# Patient Record
Sex: MALE | Race: ASIAN | NOT HISPANIC OR LATINO | Employment: FULL TIME | URBAN - METROPOLITAN AREA
[De-identification: names, ages, dates, MRNs, and addresses within clinical notes are randomized per-mention and may not be internally consistent; named-entity substitution may affect disease eponyms.]

---

## 2023-06-06 ENCOUNTER — OFFICE VISIT (OUTPATIENT)
Dept: PRIMARY CARE | Facility: CLINIC | Age: 33
End: 2023-06-06
Payer: COMMERCIAL

## 2023-06-06 VITALS
DIASTOLIC BLOOD PRESSURE: 72 MMHG | HEIGHT: 67 IN | RESPIRATION RATE: 18 BRPM | OXYGEN SATURATION: 99 % | WEIGHT: 214 LBS | BODY MASS INDEX: 33.59 KG/M2 | TEMPERATURE: 98 F | HEART RATE: 73 BPM | SYSTOLIC BLOOD PRESSURE: 118 MMHG

## 2023-06-06 DIAGNOSIS — M10.9 GOUT OF MULTIPLE SITES, UNSPECIFIED CAUSE, UNSPECIFIED CHRONICITY: Primary | ICD-10-CM

## 2023-06-06 PROCEDURE — 99214 OFFICE O/P EST MOD 30 MIN: CPT | Performed by: FAMILY MEDICINE

## 2023-06-06 PROCEDURE — 1036F TOBACCO NON-USER: CPT | Performed by: FAMILY MEDICINE

## 2023-06-06 RX ORDER — ALLOPURINOL 100 MG/1
100 TABLET ORAL DAILY
Qty: 90 TABLET | Refills: 1 | Status: SHIPPED | OUTPATIENT
Start: 2023-06-06 | End: 2023-07-21 | Stop reason: SDUPTHER

## 2023-06-06 RX ORDER — COLCHICINE 0.6 MG/1
TABLET ORAL
COMMUNITY

## 2023-06-06 NOTE — PROGRESS NOTES
"Subjective   Patient ID: John Barker is a 32 y.o. male who presents for Gout (Pt would like to discuss tx for Gout, was on Colchicine but has ran out).    HPI   Reports that he gets about one attack of gout a month.  Previously was just in his right 2nd finger but most recently had significant pain in the right first toe and did not realize it was gout initially.  Colchicine helped sometime but getting attacks frequently.    Review of Systems  Negative unless noted in HPI    Objective   /72 (BP Location: Left arm, Patient Position: Sitting)   Pulse 73   Temp 36.7 °C (98 °F) (Temporal)   Resp 18   Ht 1.702 m (5' 7.01\")   Wt 97.1 kg (214 lb)   SpO2 99%   BMI 33.51 kg/m²     Physical Exam  Pulmonary:      Effort: Pulmonary effort is normal.   Musculoskeletal:      Comments: Right foot, 1st toe at MTP joint there is some mild residual erythema present   Neurological:      Mental Status: He is alert.         Assessment/Plan   Problem List Items Addressed This Visit          Other    Gout of multiple sites - Primary     Records reviewed including past uric acid level of 8.4  Due to frequency of exacerbations and now presence of several joints we will go ahead and start on prophylaxis  Allopurinol 100mg initially, did discuss that likely higher dose would be needed to achieve target uric acid level  Discussed diet and how it pertains to exacerbations, recommendations to avoid foods high in purines discussed         Relevant Medications    allopurinol (Zyloprim) 100 mg tablet          "

## 2023-06-06 NOTE — ASSESSMENT & PLAN NOTE
Records reviewed including past uric acid level of 8.4  Due to frequency of exacerbations and now presence of several joints we will go ahead and start on prophylaxis  Allopurinol 100mg initially, did discuss that likely higher dose would be needed to achieve target uric acid level  Discussed diet and how it pertains to exacerbations, recommendations to avoid foods high in purines discussed

## 2023-07-21 DIAGNOSIS — M10.9 GOUT OF MULTIPLE SITES, UNSPECIFIED CAUSE, UNSPECIFIED CHRONICITY: ICD-10-CM

## 2023-07-21 RX ORDER — ALLOPURINOL 300 MG/1
300 TABLET ORAL DAILY
Qty: 90 TABLET | Refills: 1 | Status: SHIPPED | OUTPATIENT
Start: 2023-07-21 | End: 2024-02-05

## 2023-09-09 DIAGNOSIS — L30.9 ECZEMA, UNSPECIFIED TYPE: Primary | ICD-10-CM

## 2023-09-11 RX ORDER — TRIAMCINOLONE ACETONIDE 1 MG/G
CREAM TOPICAL 2 TIMES DAILY
Qty: 30 G | Refills: 1 | Status: SHIPPED | OUTPATIENT
Start: 2023-09-11 | End: 2024-01-26

## 2023-10-05 ENCOUNTER — OFFICE VISIT (OUTPATIENT)
Dept: PRIMARY CARE | Facility: CLINIC | Age: 33
End: 2023-10-05
Payer: COMMERCIAL

## 2023-10-05 VITALS
BODY MASS INDEX: 34.31 KG/M2 | HEIGHT: 67 IN | SYSTOLIC BLOOD PRESSURE: 112 MMHG | TEMPERATURE: 97.5 F | HEART RATE: 84 BPM | OXYGEN SATURATION: 98 % | WEIGHT: 218.6 LBS | DIASTOLIC BLOOD PRESSURE: 74 MMHG

## 2023-10-05 DIAGNOSIS — Z00.00 HEALTH MAINTENANCE EXAMINATION: Primary | ICD-10-CM

## 2023-10-05 DIAGNOSIS — M1A.09X0 CHRONIC GOUT OF MULTIPLE SITES, UNSPECIFIED CAUSE: ICD-10-CM

## 2023-10-05 DIAGNOSIS — Z23 NEED FOR INFLUENZA VACCINATION: ICD-10-CM

## 2023-10-05 LAB
ALBUMIN SERPL BCP-MCNC: 4.9 G/DL (ref 3.4–5)
ALP SERPL-CCNC: 66 U/L (ref 33–120)
ALT SERPL W P-5'-P-CCNC: 113 U/L (ref 10–52)
ANION GAP SERPL CALC-SCNC: 14 MMOL/L (ref 10–20)
AST SERPL W P-5'-P-CCNC: 39 U/L (ref 9–39)
BILIRUB SERPL-MCNC: 0.5 MG/DL (ref 0–1.2)
BUN SERPL-MCNC: 13 MG/DL (ref 6–23)
CALCIUM SERPL-MCNC: 10.2 MG/DL (ref 8.6–10.6)
CHLORIDE SERPL-SCNC: 104 MMOL/L (ref 98–107)
CHOLEST SERPL-MCNC: 260 MG/DL (ref 0–199)
CHOLESTEROL/HDL RATIO: 6.6
CO2 SERPL-SCNC: 28 MMOL/L (ref 21–32)
CREAT SERPL-MCNC: 0.89 MG/DL (ref 0.5–1.3)
ERYTHROCYTE [DISTWIDTH] IN BLOOD BY AUTOMATED COUNT: 14.7 % (ref 11.5–14.5)
GFR SERPL CREATININE-BSD FRML MDRD: >90 ML/MIN/1.73M*2
GLUCOSE SERPL-MCNC: 94 MG/DL (ref 74–99)
HCT VFR BLD AUTO: 49.4 % (ref 41–52)
HDLC SERPL-MCNC: 39.2 MG/DL
HGB BLD-MCNC: 15.4 G/DL (ref 13.5–17.5)
LDLC SERPL CALC-MCNC: 169 MG/DL (ref 130–180)
MCH RBC QN AUTO: 26.9 PG (ref 26–34)
MCHC RBC AUTO-ENTMCNC: 31.2 G/DL (ref 32–36)
MCV RBC AUTO: 86 FL (ref 80–100)
NON HDL CHOLESTEROL: 221 MG/DL (ref 0–149)
NRBC BLD-RTO: 0 /100 WBCS (ref 0–0)
PLATELET # BLD AUTO: 305 X10*3/UL (ref 150–450)
PMV BLD AUTO: 11.2 FL (ref 7.5–11.5)
POTASSIUM SERPL-SCNC: 4.4 MMOL/L (ref 3.5–5.3)
PROT SERPL-MCNC: 8 G/DL (ref 6.4–8.2)
RBC # BLD AUTO: 5.73 X10*6/UL (ref 4.5–5.9)
SODIUM SERPL-SCNC: 142 MMOL/L (ref 136–145)
TRIGL SERPL-MCNC: 260 MG/DL (ref 0–149)
URATE SERPL-MCNC: 7 MG/DL (ref 4–7.5)
VLDL: 52 MG/DL (ref 0–40)
WBC # BLD AUTO: 5.1 X10*3/UL (ref 4.4–11.3)

## 2023-10-05 PROCEDURE — 84550 ASSAY OF BLOOD/URIC ACID: CPT

## 2023-10-05 PROCEDURE — 80061 LIPID PANEL: CPT

## 2023-10-05 PROCEDURE — 90686 IIV4 VACC NO PRSV 0.5 ML IM: CPT | Performed by: FAMILY MEDICINE

## 2023-10-05 PROCEDURE — 90471 IMMUNIZATION ADMIN: CPT | Performed by: FAMILY MEDICINE

## 2023-10-05 PROCEDURE — 99395 PREV VISIT EST AGE 18-39: CPT | Performed by: FAMILY MEDICINE

## 2023-10-05 PROCEDURE — 80053 COMPREHEN METABOLIC PANEL: CPT

## 2023-10-05 PROCEDURE — 85027 COMPLETE CBC AUTOMATED: CPT

## 2023-10-05 PROCEDURE — 1036F TOBACCO NON-USER: CPT | Performed by: FAMILY MEDICINE

## 2023-10-05 PROCEDURE — 36415 COLL VENOUS BLD VENIPUNCTURE: CPT

## 2023-10-05 ASSESSMENT — PAIN SCALES - GENERAL: PAINLEVEL: 0-NO PAIN

## 2023-10-05 ASSESSMENT — PATIENT HEALTH QUESTIONNAIRE - PHQ9
2. FEELING DOWN, DEPRESSED OR HOPELESS: NOT AT ALL
SUM OF ALL RESPONSES TO PHQ9 QUESTIONS 1 AND 2: 0
1. LITTLE INTEREST OR PLEASURE IN DOING THINGS: NOT AT ALL

## 2023-10-05 NOTE — PROGRESS NOTES
"Reason for Visit: Annual Physical Exam    HPI:  Presents for wellness visit  Gout has been well controlled with increased Allopurinol  Recently did testing and found to have Thalassemia A heterozygous t    Active Problem List  Patient Active Problem List   Diagnosis    Gout of multiple sites    Health maintenance examination       Comprehensive Medical/Surgical/Social/Family History  Past Medical History:   Diagnosis Date    Other diseases of tongue 09/17/2020    Tongue lesion     Past Surgical History:   Procedure Laterality Date    OTHER SURGICAL HISTORY  09/13/2021    Anal fissurectomy    OTHER SURGICAL HISTORY  09/13/2021    Corneal lasik     Social History     Tobacco Use    Smoking status: Never    Smokeless tobacco: Never   Substance Use Topics    Alcohol use: Not Currently    Drug use: Never     No family history on file.      Allergies and Medications  Patient has no known allergies.  Current Outpatient Medications on File Prior to Visit   Medication Sig Dispense Refill    allopurinol (Zyloprim) 300 mg tablet Take 1 tablet (300 mg) by mouth once daily. 90 tablet 1    colchicine 0.6 mg tablet TAKE 2 TABLETS, MAY REPEAT 1 TABLET 1 HOUR LATER FOR ACUTE ATTACK      triamcinolone (Kenalog) 0.1 % cream APPLY TO AFFECTED AREA TWICE A DAY 30 g 1     No current facility-administered medications on file prior to visit.         ROS otherwise negative aside from what was mentioned above in HPI.    Vitals  /74 (BP Location: Left arm, Patient Position: Sitting, BP Cuff Size: Large adult)   Pulse 84   Temp 36.4 °C (97.5 °F) (Oral)   Ht 1.702 m (5' 7\")   Wt 99.2 kg (218 lb 9.6 oz)   SpO2 98%   BMI 34.24 kg/m²   Body mass index is 34.24 kg/m².  Physical Exam  Gen: Alert, NAD  HEENT:  PERRL, EOMI, conjunctiva and sclera normal in appearance. External auditory canals/TMs normal; Oral cavity and posterior pharynx without lesions/exudate  Neck:  Supple with FROM; No masses/nodes palpable; Thyroid nontender and " without nodules; No ALIN  Respiratory:  Lungs CTAB  Cardiovascular:  Heart RRR. No M/R/G. Peripheral pulses equal bilaterally  Abdomen:  Soft, nontender, BS present throughout; No R/G/R; No HSM or masses palpated  Extremities:  FROM all extremities; Muscle strength grossly normal with good tone  Neuro:  CN II-XII intact; Gross motor and sensory intact  Skin:  No suspicious lesions present    Assessment and Plan:  Problem List Items Addressed This Visit       Gout of multiple sites    Relevant Orders    Uric acid    Health maintenance examination - Primary    Current Assessment & Plan     Recommended screening guidelines addressed and orders placed as indicated by age and chronic conditions  Screening labs ordered, will call with results  Continue to work on healthy lifestyle including well balanced diet, regular activity, limit alcohol, no tobacco products and safe sexual practices  Follow up annually           Relevant Orders    CBC    Comprehensive Metabolic Panel    Lipid Panel     Other Visit Diagnoses       Need for influenza vaccination        Relevant Orders    Flu vaccine (IIV4) age 6 months and greater, preservative free (Completed)              Suzie Graff MD

## 2023-10-10 DIAGNOSIS — R79.89 ELEVATED LFTS: Primary | ICD-10-CM

## 2023-10-11 ENCOUNTER — ANCILLARY PROCEDURE (OUTPATIENT)
Dept: RADIOLOGY | Facility: CLINIC | Age: 33
End: 2023-10-11
Payer: COMMERCIAL

## 2023-10-11 DIAGNOSIS — R79.89 ELEVATED LFTS: ICD-10-CM

## 2023-10-11 PROCEDURE — 76705 ECHO EXAM OF ABDOMEN: CPT

## 2023-10-11 PROCEDURE — 76705 ECHO EXAM OF ABDOMEN: CPT | Performed by: STUDENT IN AN ORGANIZED HEALTH CARE EDUCATION/TRAINING PROGRAM

## 2023-10-13 DIAGNOSIS — E78.2 MIXED HYPERLIPIDEMIA: Primary | ICD-10-CM

## 2023-10-13 RX ORDER — ATORVASTATIN CALCIUM 10 MG/1
10 TABLET, FILM COATED ORAL DAILY
Qty: 30 TABLET | Refills: 5 | Status: SHIPPED | OUTPATIENT
Start: 2023-10-13 | End: 2023-12-06 | Stop reason: SDUPTHER

## 2023-10-17 DIAGNOSIS — Z31.69 PRE-CONCEPTION COUNSELING: Primary | ICD-10-CM

## 2023-10-19 ENCOUNTER — LAB (OUTPATIENT)
Dept: LAB | Facility: LAB | Age: 33
End: 2023-10-19
Payer: COMMERCIAL

## 2023-10-19 ENCOUNTER — CLINICAL SUPPORT (OUTPATIENT)
Dept: GENETICS | Facility: CLINIC | Age: 33
End: 2023-10-19
Payer: COMMERCIAL

## 2023-10-19 VITALS
HEIGHT: 67 IN | SYSTOLIC BLOOD PRESSURE: 142 MMHG | BODY MASS INDEX: 34.84 KG/M2 | WEIGHT: 222 LBS | HEART RATE: 94 BPM | DIASTOLIC BLOOD PRESSURE: 88 MMHG

## 2023-10-19 DIAGNOSIS — Z31.5 ENCOUNTER FOR PROCREATIVE GENETIC COUNSELING: ICD-10-CM

## 2023-10-19 DIAGNOSIS — Z31.440 ENCOUNTER OF MALE FOR TESTING FOR GENETIC DISEASE CARRIER STATUS FOR PROCREATIVE MANAGEMENT: ICD-10-CM

## 2023-10-19 DIAGNOSIS — Z31.69 PRE-CONCEPTION COUNSELING: ICD-10-CM

## 2023-10-19 PROCEDURE — 96040 PR MEDICAL GENETICS COUNSELING EACH 30 MINUTES: CPT

## 2023-10-19 PROCEDURE — 36415 COLL VENOUS BLD VENIPUNCTURE: CPT

## 2023-10-19 PROCEDURE — 96040 HC GENETIC COUNSELING, EACH 30 MIN: CPT

## 2023-10-19 ASSESSMENT — PAIN SCALES - GENERAL: PAINLEVEL: 0-NO PAIN

## 2023-10-19 NOTE — PROGRESS NOTES
Thank you for the referral of Mr. John Barker. he is a 33 y.o. male who was seen for genetic counseling due to desire for expanded carrier screening.    PAST HISTORY:   Mr. Barker has no children or prior pregnancies with his current or previous partners.     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 The results are as follows:    This was sent from the patient through [employer] to his primary care provider, Dr. Suzie Graff MD, in encounter dated 09/27/2023. This result means that Mr. Barker is a SILENT CARRIER of alpha thalassemia, or a carrier of alpha thalassemia minor (not expected to be affected). This testing was done in [place], where his partner currently lives. He stated that it was completed due to general population screening recommendations. He does not believe carrier screening for any other genetic conditions has been done yet.     The couple wishes to pursue pregnancy soon. His partner had eggs frozen in [place] previously, and genetic screening was recommended to them to determine if there is any kind of increased prenatal risks.     FAMILY HISTORY  Medical and family histories were reviewed and no undue concerns were apparent. The family history was negative for birth defects, intellectual disability, recurrent pregnancy loss, or recognized inherited conditions. Consanguinity denied.    REPORTED ETHNICITY/RACE:  Patient: Chinese  Patient's partner: Chinese    COUNSELING:    The following information was discussed with your patient:    Alpha thalassemia. We reviewed that testing for alpha thalassemia analyzes the number of alpha globin genes present in an individual. There are typically 4 copies of the gene (??/??).  People missing one (1) alpha globin gene (??/?-) are called silent carriers of alpha thalassemia. This means they can pass on the condition of having one (1) gene missing. However, being without a gene does not affect their  health... a silent carrier does not have any symptoms.  People with 2 missing alpha globin genes (??/-- or ?-/?-) have alpha thalassemia trait (also known as alpha thalassemia minor). This normally does not cause health problems but can cause low red blood cell levels (anemia) and small red blood cells.  People with 3 missing genes have hemoglobin H disease (?-/--). This disease can cause health problems such as iron overload, splenomegaly, and need for blood transfusion.   People with 4 missing genes have hydrops fetalis (--/--), also know as alpha thalassemia major or Hemoglobin Barts disease. This is a life-threatening condition that begins in utero that can lead to miscarriage and/or major health concerns for the fetus.  We discussed that without patient's partner's report, current reproductive risk is unclear; however, based on his 3 copies of the alpha globin genes, it is very unlikely to conceive a child with the most severe form of alpha thalassemia, hydrops fetalis. The other 3 genotypes are theoretically possible if his partner has HbH disease, alpha thalassemia minor, or is a silent carrier. Understanding her specific genotype may help clarify risks further.   Per updated ACOG recommendations from March 2017, we discussed the availability, benefits, and limitations of carrier screening for cystic fibrosis (CF), spinal muscular atrophy (SMA), and hemoglobinopathies/thalassemias. We reviewed the carrier frequencies of these conditions, varied clinical manifestations, and their autosomal recessive inheritance.   We also discussed the availability of more expanded/pan ethnic carrier screening for additional, primarily autosomal recessive, conditions. We discussed the pros and cons of expanded carrier screening including the higher likelihood of being identified as a carrier for at least one condition on a larger panel.  If both members of the couple are found to be carriers for the same condition, there is a  25% chance for an affected child and prenatal diagnosis would be available. Approximately 2-4% of couples are found to be at risk to have a child with a genetic disorder based on this screening.   If John is identified as a carrier of one or more autosomal recessive genetic conditions, we will discuss what that condition is and what the reproductive risks are. Reproductive partners can then decide if they would like to proceed with carrier screening as well. Having results from both partners is often required to understand full scope of reproductive risk.   If patient and his partner are carriers for the same genetic conditions, there would be a 25% chance of having an affected child. They would have several options in that case including:   Conceive naturally and consider genetic testing in the prenatal period (as early as 10 weeks gestation) or  genetic testing.   In vitro fertilization (IVF) with embryo screening for the specific condition (preimplantation genetic testing for monogenic disease//PGT-M). We reviewed that while this option is theoretically possible since patient's partner already has frozen eggs, however, it would require review from a specialized laboratory that performs this type of testing. It typically requires familial probe set up, which may or may not be possible with the patient's partner and her family living in [place]. If the couple is identified to be carriers of the same condition, we will further discuss these options in the future.   Gamete donation/adoption  Mr. Barker inquired about genetic testing for his partner if she doesn't have commercial insurance in the [place]. We discussed that [employer] offers a $100 self pay out of pocket cost for patients without insurance whose partners have already had testing. So, regardless of how much he pays for testing today, his partner will have a $100 cost without insurance.     DISPOSITION:  The patient stated that he understood the above  information and elected to proceed with expanded carrier screening via the 556-gene [facility] comprehensive carrier screen. Patient was handed a genetic testing kit and sent to the lab for a blood draw today.. Results will take about 3 weeks to return, at which time results and follow up plan will be discussed with patient and his partner. Mr. Barker stated that his partner is expected to be in town in about a month. I encouraged him to call the [facility] at [phone] to schedule a follow up appointment when his partner is in town to discuss his results, consider testing for his partner, and to review any additional questions that she may have.     I spent 50 minutes with the patient with greater than 50% of the time spent in face to face counseling.     Thank you for allowing us to participate in the care of your patient. Should you or your patient have any questions, please do not hesitate to contact our office at [phone].    Sincerely,   Laura Lara MS, [facility]  Licensed Genetic Counselor

## 2023-11-08 ENCOUNTER — TELEPHONE (OUTPATIENT)
Dept: GENETICS | Facility: CLINIC | Age: 33
End: 2023-11-08
Payer: COMMERCIAL

## 2023-11-08 NOTE — TELEPHONE ENCOUNTER
Patient returned my call; he is agreeable to a follow up genetic counseling appointment with his reproductive partner next Thursday, 11/16/23 at 2:30pm. This will be at the [facility] [address] of the [facility]; [address]. If he has additional scheduling needs, he can contact the [facility] at [phone].    Laura Lara [facility]

## 2023-11-08 NOTE — TELEPHONE ENCOUNTER
Left message for patient requesting call back to schedule follow up genetic counseling appointment to discuss results of genetic testing and next steps. My callback number is [phone].    Laura Lara [facility]

## 2023-11-16 ENCOUNTER — ALLIED HEALTH (OUTPATIENT)
Dept: GENETICS | Facility: CLINIC | Age: 33
End: 2023-11-16
Payer: COMMERCIAL

## 2023-11-16 VITALS
TEMPERATURE: 98.1 F | WEIGHT: 221 LBS | SYSTOLIC BLOOD PRESSURE: 126 MMHG | DIASTOLIC BLOOD PRESSURE: 73 MMHG | HEIGHT: 67 IN | BODY MASS INDEX: 34.69 KG/M2

## 2023-11-16 DIAGNOSIS — Z14.8 GENETIC CARRIER OF OTHER DISEASE: ICD-10-CM

## 2023-11-16 DIAGNOSIS — Z31.5 ENCOUNTER FOR PROCREATIVE GENETIC COUNSELING: ICD-10-CM

## 2023-11-16 PROCEDURE — 96040 PR MEDICAL GENETICS COUNSELING EACH 30 MINUTES: CPT

## 2023-11-16 PROCEDURE — 96040 HC GENETIC COUNSELING, EACH 30 MIN: CPT

## 2023-11-16 ASSESSMENT — PAIN SCALES - GENERAL: PAINLEVEL: 0-NO PAIN

## 2023-11-16 NOTE — PROGRESS NOTES
The results of the 556-gene comprehensive carrier screen from [facility] were shared with Mr. John Barker and his partner today at their in person genetic counseling appointment today. This testing was ordered at the patient's initial genetic counseling appointment on 10/19/2023. The couple gave permission to share results with one another and to have this conversation documented together. Partner's name is Sahra Gold,  1986.     The technology, benefits, and limitations of carrier screening were again reviewed, including that it does not screen for all genetic conditions. Variants of unknown significance are not reported. Actual reproductive risks may be higher than quoted due to this fact, as well as the potential for some conditions to have digenic inheritance.     For the majority of the conditions tested, which are autosomal recessive, if a partner of a pregnancy is also a carrier, the chance to have an affected child is 1 in 4 (or 25%). On the other hand, for the autosomal recessive conditions tested, the chance to have a child with the condition is considerably lower if only one parent is a carrier for the condition. Normal carrier screening reduces the likelihood that a person is a carrier, but does not eliminate it. In most cases, carriers of an autosomal recessive genetic disorder are not expected to have symptoms.     The expanded carrier screen revealed that John  is a carrier of the following:    he tested negative for the other 554 genes on the panel. Please see linked media for a copy of the full report, including all genes screened for.    COUNSELING:   Alpha thalassemia. This result means that John is a SILENT CARRIER of alpha thalassemia, but he  is not expected to be affected with this condition. He was previously counseled on this finding at his initial genetic counseling appointment on 10/19/2023.  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 We again reviewed that this can cause health problems such as iron overload, splenomegaly, and need for blood transfusion. Of note, Ms. Gold denies any ongoing medical concerns related to this alpha thalassemia status.   We reviewed reproductive risks in great detail today. There are typically 4 copies of the gene (??/??). Based on the couple's alpha globin gene status, there is an essentially zero chance that they could have a baby together with all 4 working copies of the gene.   People missing one (1) alpha globin gene (??/?-) are called silent carriers of alpha thalassemia. This means they can pass on the condition of having one (1) gene missing. However, being without a gene does not affect their health... a silent carrier does not have any symptoms. There is a 25% chance with each future pregnancy that the couple has a child that is a silent carrier.   People with 2 missing alpha globin genes (??/-- or ?-/?-) have alpha thalassemia trait (also known as alpha thalassemia minor). This normally does not cause health problems but can cause low red blood cell levels (anemia) and small red blood cells. There is a 50% chance (25% chance ??/-- (cis) or 25% chance ?-/?- (trans)) that the couple has a child that has alpha thalassemia trait.   People with 3 missing genes have hemoglobin H disease (?-/--). This disease can cause health problems such as iron overload, splenomegaly, and need for blood transfusion. There is a 25% chance that the couple has a child that has hemoglobin H disease.  People with 4 missing genes have hydrops fetalis (--/--), also know as alpha thalassemia major or Hemoglobin Barts disease. This is a life-threatening condition that begins in utero that can lead to miscarriage and/or major health concerns for the fetus. There is an essentially zero chance that the couple has a child with Hemoglobin Barts based on John having to pass down at  least one copy of the alpha globin gene with each pregnancy.   GJB2-related conditions. This result means that John is a CARRIER of nonsyndromic hearing loss, but he  is not expected to be affected with this condition. Based on general population risk, Sahra has a 1 in 50 chance of being a carrier of this condition as well, and therefore there is currently a 1 in 200 risk of having a future affected child. Nonsyndromic hearing loss causes either complete or partial deafness, but it is not expected to affect other parts of the body.   Autosomal recessive inheritance. In these cases, both parents must have one nonworking copy of the same gene. If Sahra is negative for GJB2, the risk of having a future affected pregnnacy will be reduced to <<1%. If she is positive for one or more of the conditions described above, each pregnancy together will have a 25% chance of being affected.  Carrier screening for Sahra is available through [facility] for the entire panel that John had initially, or, for just the specific genes he is positive for, to clarify risk for pregnancy. Based on John's results alone, there is a 50% chance that each child is a carrier of each condition.  Other family members, Given the alpha globin gene status as described above, both of the couple's parents and siblings have at least a 50% chance of having a deletion of at least one alpha globin gene. Should they desire carrier screening for future reproductive purposes, they can reach out to the [facility] for a prenatal or preconception appointment.  Sahra reports that she froze eggs in [place] within the past year, and is interested in pursing in vitro fertilization with preimplantation genetic testing for alpha thalassemia. We again reviewed that it is not possible to have a child with 4 working copies of the gene. We also discussed that there is no guarantee of specific alpha globin genes in an embryo even if they proceed with alpha  thalassemia probe development. Sahra inquired about gene editing to specific alpha globin status, and we discussed that preimplantation genetic testing involves selecting embryos, not editing them.   If the couple decides that they are interested in proceeding with preimplantation genetic testing, this can be done either here at [facility], or in [place], where Sahra already began to receive some of her care. We also discussed the option of proceeding with natural conception and early prenatal testing vs.  testing for alpha thalassemia. The couple is currently undecided on next steps for conception.     DISPOSITION:  John Lucero and Sahra Gold expressed understanding of the above information, and Sahra elected to proceed with the comprehensive carrier screening panel from [facility]. An order was placed in Epic today, and she was sent to the lab for a blood draw. Results will return in 2-3 weeks, at which time I will call to disclose and discuss next steps. Sahra stated that the number in her chart is John's number, and gave verbal permission to disclose these results to him, too. At that time, I will inquire again about desire for conception and send any necessary referrals.      Should they or their providers have additional questions about the information reviewed at today’s appointment, they can call my direct line at [phone] for coordination of testing.     I spent 60 minutes with the patient, with greater than 60 minutes spent in direct face to face counseling.     Sincerely,   Laura Lara [facility]

## 2023-11-21 ENCOUNTER — TELEPHONE (OUTPATIENT)
Dept: GENETICS | Facility: CLINIC | Age: 33
End: 2023-11-21
Payer: COMMERCIAL

## 2023-11-21 NOTE — TELEPHONE ENCOUNTER
John called to inquire about the estimated $100 cost of expanded carrier screening for his partner, Sahra. In the [employer] portal, it stated that there is a $250 out of pocket cost. I contacted the laboratory, as the billing policy has been $100 OOP for partners of individuals who have already had carrier screening. I received the below communication in response:        I apologized to the patient, as our clinical team was not previously made aware of this change to the billing policy. He stated that they will still proceed with testing for the $250 OOP cost. I will call the couple to disclose results when they are received in a couple of weeks.    Sincerely,   Laura Lara, [facility]

## 2023-12-06 DIAGNOSIS — E78.2 MIXED HYPERLIPIDEMIA: ICD-10-CM

## 2023-12-06 RX ORDER — ATORVASTATIN CALCIUM 10 MG/1
10 TABLET, FILM COATED ORAL DAILY
Qty: 90 TABLET | Refills: 1 | Status: SHIPPED | OUTPATIENT
Start: 2023-12-06 | End: 2024-06-03

## 2023-12-18 ENCOUNTER — TELEPHONE (OUTPATIENT)
Dept: GENETICS | Facility: CLINIC | Age: 33
End: 2023-12-18
Payer: COMMERCIAL

## 2023-12-18 NOTE — TELEPHONE ENCOUNTER
Patient called to schedule a preconception consult to discuss the results of carrier screening for both him and his partner. He is agreeable to an appointment on Wednesday, 12/27/2023 at 1:00 pm. I will have him added to the schedule.    Laura Lara, [facility]

## 2023-12-27 ENCOUNTER — CLINICAL SUPPORT (OUTPATIENT)
Dept: GENETICS | Facility: CLINIC | Age: 33
End: 2023-12-27
Payer: COMMERCIAL

## 2023-12-27 VITALS
BODY MASS INDEX: 34.61 KG/M2 | HEART RATE: 86 BPM | SYSTOLIC BLOOD PRESSURE: 142 MMHG | DIASTOLIC BLOOD PRESSURE: 94 MMHG | HEIGHT: 67 IN

## 2023-12-27 DIAGNOSIS — Z31.5 ENCOUNTER FOR PROCREATIVE GENETIC COUNSELING: ICD-10-CM

## 2023-12-27 DIAGNOSIS — Z14.8 GENETIC CARRIER OF OTHER DISEASE: ICD-10-CM

## 2023-12-27 PROCEDURE — 96040 PR MEDICAL GENETICS COUNSELING EACH 30 MINUTES: CPT

## 2023-12-27 PROCEDURE — 96040 HC GENETIC COUNSELING, EACH 30 MIN: CPT

## 2023-12-27 ASSESSMENT — PAIN SCALES - GENERAL: PAINLEVEL: 0-NO PAIN

## 2023-12-27 NOTE — PROGRESS NOTES
Mr. John Barker ( 1990) and Ms. Sahra Gold ( 1988) return to genetic counseling today in person to review the results of Sahra's expanded carrier screen; Pias was reviewed at their previous genetic counseling appointment on 2023. The couple gave permission to share results with one another and to have this conversation documented together.     The technology, benefits, and limitations of carrier screening were again reviewed, including that it does not screen for all genetic conditions. Variants of unknown significance are not reported. Actual reproductive risks may be higher than quoted due to this fact, as well as the potential for some conditions to have digenic inheritance.     For the majority of the conditions tested, which are autosomal recessive, if a partner of a pregnancy is also a carrier, the chance to have an affected child is 1 in 4 (or 25%). On the other hand, for the autosomal recessive conditions tested, the chance to have a child with the condition is considerably lower if only one parent is a carrier for the condition. Normal carrier screening reduces the likelihood that a person is a carrier, but does not eliminate it. In most cases, carriers of an autosomal recessive genetic disorder are not expected to have symptoms.     We reviewed prior results; John's expanded carrier screen revealed that he is a carrier of the following:    he tested negative for the other 554 genes on the panel. Please see linked media for a copy of the full report, including all genes screened for.    Sahra is a carrier of the following:    She tested negative for the other 553 genes on the panel. Please see linked media for a copy of the full report, including all genes screened for.    COUNSELING:   Alpha thalassemia. We again reviewed that John is a SILENT CARRIER of alpha thalassemia, but he is not expected to be affected with this condition. Sahra's genetic test results confirm that she has a  "diagnosis of HbH disease, and the couple was previously aware of this finding. This was reviewed in depth at their genetic counseling appointment on 11/16/2023, and neither patient had additional questions on this finding today.  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 Nonsyndromic hearing loss causes either complete or partial deafness, but it is not expected to affect other parts of the body. 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 This variant is common in the general population; it is identified in about 8% of people with  decent.  Although this variant is more common in the population than expected for a pathogenic variant, the penetrance of this variant is estimated to be less than 20% of other disease-causing variants in GJB2 (PMID: 89576225, [id number]). This means that individuals who are homozygous for the variant can have normal hearing, mild hearing loss, moderate hearing loss, or profound hearing loss. Even with genetic information we cannot predict severity of disease.   There is a 1 in 4 (25%) chance that a future pregnancy inherits both non-working copies of the GJB2 gene, however, the risk of a future child to have true hearing loss is likely lower than this.   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 Based on John's negative screen, there is a residual risk of <1 in 500 that he is a carrier and this is not detectable by our current technology. There is therefore currently a <1 in 2,000 risk of having a future affected child. Per the [facility] report, \"Trimethylaminuria is a condition in which the body is unable to break down a nitrogen-containing compound called trimethylamine, which then builds up in the body, leading to the symptoms of the " "condition. Trimethylamine has a strong odor that has been described as smelling like rotting fish, and typically, it is converted by the body into a compound that has no odor. Individuals with trimethylaminuria are not able to properly convert trimethylamine. As trimethylamine accumulates in the body, affected individuals give off a characteristic strong fishy odor in their urine, sweat, and breath. Symptoms are usually present at birth and may worsen during puberty. Stress and diet may also be involved in triggering symptoms. In females, symptoms are more severe around the time of menstruation, after taking oral contraceptives, and around menopause. Trimethylaminuria does not cause physical symptoms, and intelligence and life expectancy are not typically affected. However, the unpleasant body odor of affected individuals often impacts social interactions and may lead to psychological issues such as social isolation and depression. Follow-up depends on each affected individual’s specific situation, and discussion with a healthcare provider should be considered.\"  Autosomal recessive inheritance. In these cases, both parents must have one nonworking copy of the same gene. Current reproductive risks for alpha thalassemia were discussed at the previous genetics appointment. Reproductive risk for hearing loss is likely <25%. The risk for a future child to have Trimethylaminuria is <<1%.   Other family members. Each parent, sibling, and child of a carrier of a genetic condition has a 50% risk to be carriers of the same conditions. Should the couple's relatives desire carrier screening for future reproductive purposes, they can reach out to the [facility] for a prenatal or preconception appointment.  We discussed options to better understand risks for a future fetus.   Sahra had eggs frozen outside of the [place] at age 34. When proceeding with fertilization of frozen oocytes and in vitro " fertilization, there is an option to proceed with preimplantation genetic testing (PGT). This type of testing takes a biopsy of embryos and sends them to a specialized laboratory that can determine if there is a risk of the embryo being affected with a chromosome abnormality (preimplantation genetic testing for aneuploidy//PGT-A) or with a single gene condition (preimplantation genetic testing for monogenic disease//PGT-M).  Early prenatal genetic testing. There are options such as chorionic villus sampling as early as 10 weeks of pregnancy to genetically test a growing fetus for genetic risks such as those described above. At 14-15 weeks, we can also test via amniocentesis. There are risks associated with these tests such as a 1 in 200 and 1 in 400 risk of complications respectively, including a 1 in 400 and 1 in 800 risk of miscarriage respectively.   All babies born in the [facility] will be screened for a specific set of genetic conditions on the new born screen.  Based on Sahra's current age of 35 y.o, the risk for a future pregnancy to have Down syndrome is approximately 1 in 130. The combined risk for the fetus to have Trisomy 21/18/13 is approximately 1 in 99. This does not include copy number variation, mosaicism, single gene disorders, translocations, and marker chromosomes. Sahra will likely be re-referred to genetic counseling during a pregnancy to discuss options for aneuploidy screening based on age alone. She does not currently have an OB provider in [place] and I will place a referral today.     DISPOSITION:  John Lucero and Sahra Gold expressed understanding of the above information. They plan to proceed with natural conception at this time. No additional genetic testing is pending at this time. The couple should be re-referred to prenatal genetics for additional testing options once pregnant. A referral was placed for Sahra to OB/gyn to establish care so that prenatal care can begin after a positive  pregnancy test.   Should they or their providers have additional questions about the information reviewed at today’s appointment, they can call my direct line at [phone] for coordination of testing.     I spent 35 minutes with the couple in face to face counseling.    Sincerely,  Laura Lara, [facility]

## 2024-01-12 DIAGNOSIS — L73.9 FOLLICULITIS: Primary | ICD-10-CM

## 2024-01-12 RX ORDER — MUPIROCIN CALCIUM 20 MG/G
CREAM TOPICAL 3 TIMES DAILY
Qty: 15 G | Refills: 0 | Status: SHIPPED | OUTPATIENT
Start: 2024-01-12 | End: 2024-01-22

## 2024-01-12 RX ORDER — CEPHALEXIN 500 MG/1
500 CAPSULE ORAL 2 TIMES DAILY
Qty: 14 CAPSULE | Refills: 0 | Status: SHIPPED | OUTPATIENT
Start: 2024-01-12 | End: 2024-01-19

## 2024-01-25 DIAGNOSIS — L30.9 ECZEMA, UNSPECIFIED TYPE: ICD-10-CM

## 2024-01-26 RX ORDER — TRIAMCINOLONE ACETONIDE 1 MG/G
CREAM TOPICAL 2 TIMES DAILY
Qty: 30 G | Refills: 1 | Status: SHIPPED | OUTPATIENT
Start: 2024-01-26

## 2024-02-03 DIAGNOSIS — M10.9 GOUT OF MULTIPLE SITES, UNSPECIFIED CAUSE, UNSPECIFIED CHRONICITY: ICD-10-CM

## 2024-02-05 RX ORDER — ALLOPURINOL 300 MG/1
300 TABLET ORAL DAILY
Qty: 90 TABLET | Refills: 1 | Status: SHIPPED | OUTPATIENT
Start: 2024-02-05

## 2024-08-03 DIAGNOSIS — L30.9 ECZEMA, UNSPECIFIED TYPE: ICD-10-CM

## 2024-08-03 DIAGNOSIS — M10.9 GOUT OF MULTIPLE SITES, UNSPECIFIED CAUSE, UNSPECIFIED CHRONICITY: ICD-10-CM

## 2024-08-05 ENCOUNTER — TELEPHONE (OUTPATIENT)
Dept: PRIMARY CARE | Facility: CLINIC | Age: 34
End: 2024-08-05
Payer: COMMERCIAL

## 2024-08-05 DIAGNOSIS — M1A.09X0 CHRONIC GOUT OF MULTIPLE SITES, UNSPECIFIED CAUSE: ICD-10-CM

## 2024-08-05 RX ORDER — ALLOPURINOL 300 MG/1
300 TABLET ORAL DAILY
Qty: 90 TABLET | Refills: 1 | Status: SHIPPED | OUTPATIENT
Start: 2024-08-05

## 2024-08-05 RX ORDER — TRIAMCINOLONE ACETONIDE 1 MG/G
CREAM TOPICAL 2 TIMES DAILY
Qty: 30 G | Refills: 1 | Status: SHIPPED | OUTPATIENT
Start: 2024-08-05

## 2024-08-05 NOTE — TELEPHONE ENCOUNTER
John would like get his labs drawn prior to his appt 9/26, also would like to be informed when orders has been placed

## 2024-09-14 DIAGNOSIS — E78.2 MIXED HYPERLIPIDEMIA: ICD-10-CM

## 2024-09-16 RX ORDER — ATORVASTATIN CALCIUM 10 MG/1
10 TABLET, FILM COATED ORAL DAILY
Qty: 90 TABLET | Refills: 1 | Status: SHIPPED | OUTPATIENT
Start: 2024-09-16

## 2024-09-26 ENCOUNTER — APPOINTMENT (OUTPATIENT)
Dept: PRIMARY CARE | Facility: CLINIC | Age: 34
End: 2024-09-26
Payer: COMMERCIAL

## 2024-10-07 ENCOUNTER — LAB (OUTPATIENT)
Dept: LAB | Facility: LAB | Age: 34
End: 2024-10-07
Payer: COMMERCIAL

## 2024-10-07 DIAGNOSIS — M1A.09X0 CHRONIC GOUT OF MULTIPLE SITES, UNSPECIFIED CAUSE: ICD-10-CM

## 2024-10-07 DIAGNOSIS — E78.2 MIXED HYPERLIPIDEMIA: ICD-10-CM

## 2024-10-07 LAB
ALBUMIN SERPL BCP-MCNC: 4.9 G/DL (ref 3.4–5)
ALP SERPL-CCNC: 59 U/L (ref 33–120)
ALT SERPL W P-5'-P-CCNC: 75 U/L (ref 10–52)
ANION GAP SERPL CALC-SCNC: 11 MMOL/L (ref 10–20)
AST SERPL W P-5'-P-CCNC: 34 U/L (ref 9–39)
BILIRUB SERPL-MCNC: 0.5 MG/DL (ref 0–1.2)
BUN SERPL-MCNC: 13 MG/DL (ref 6–23)
CALCIUM SERPL-MCNC: 10 MG/DL (ref 8.6–10.6)
CHLORIDE SERPL-SCNC: 105 MMOL/L (ref 98–107)
CHOLEST SERPL-MCNC: 174 MG/DL (ref 0–199)
CHOLESTEROL/HDL RATIO: 3.6
CO2 SERPL-SCNC: 29 MMOL/L (ref 21–32)
CREAT SERPL-MCNC: 0.96 MG/DL (ref 0.5–1.3)
EGFRCR SERPLBLD CKD-EPI 2021: >90 ML/MIN/1.73M*2
GLUCOSE SERPL-MCNC: 91 MG/DL (ref 74–99)
HDLC SERPL-MCNC: 48.5 MG/DL
LDLC SERPL CALC-MCNC: 92 MG/DL
NON HDL CHOLESTEROL: 126 MG/DL (ref 0–149)
POTASSIUM SERPL-SCNC: 4.4 MMOL/L (ref 3.5–5.3)
PROT SERPL-MCNC: 7.7 G/DL (ref 6.4–8.2)
SODIUM SERPL-SCNC: 141 MMOL/L (ref 136–145)
TRIGL SERPL-MCNC: 168 MG/DL (ref 0–149)
URATE SERPL-MCNC: 6.6 MG/DL (ref 4–7.5)
VLDL: 34 MG/DL (ref 0–40)

## 2024-10-07 PROCEDURE — 80061 LIPID PANEL: CPT

## 2024-10-07 PROCEDURE — 36415 COLL VENOUS BLD VENIPUNCTURE: CPT

## 2024-10-07 PROCEDURE — 84550 ASSAY OF BLOOD/URIC ACID: CPT

## 2024-10-07 PROCEDURE — 80053 COMPREHEN METABOLIC PANEL: CPT

## 2024-10-25 ENCOUNTER — PATIENT MESSAGE (OUTPATIENT)
Dept: PRIMARY CARE | Facility: CLINIC | Age: 34
End: 2024-10-25
Payer: COMMERCIAL

## 2024-11-05 PROBLEM — M10.9 GOUT: Status: ACTIVE | Noted: 2024-11-05

## 2024-11-05 PROBLEM — L30.9 ECZEMA: Status: ACTIVE | Noted: 2024-11-05

## 2024-11-05 PROBLEM — E66.9 OBESITY: Status: ACTIVE | Noted: 2024-11-05

## 2024-11-05 RX ORDER — MUPIROCIN 20 MG/G
OINTMENT TOPICAL
COMMUNITY
Start: 2024-01-12

## 2024-11-06 ENCOUNTER — APPOINTMENT (OUTPATIENT)
Dept: PRIMARY CARE | Facility: CLINIC | Age: 34
End: 2024-11-06
Payer: COMMERCIAL

## 2024-11-06 VITALS
WEIGHT: 214 LBS | HEIGHT: 67 IN | HEART RATE: 72 BPM | SYSTOLIC BLOOD PRESSURE: 116 MMHG | OXYGEN SATURATION: 99 % | BODY MASS INDEX: 33.59 KG/M2 | TEMPERATURE: 97.9 F | DIASTOLIC BLOOD PRESSURE: 88 MMHG

## 2024-11-06 DIAGNOSIS — Z00.00 HEALTH MAINTENANCE EXAMINATION: Primary | ICD-10-CM

## 2024-11-06 DIAGNOSIS — Z23 NEED FOR INFLUENZA VACCINATION: ICD-10-CM

## 2024-11-06 DIAGNOSIS — Z23 NEED FOR HPV VACCINATION: ICD-10-CM

## 2024-11-06 PROCEDURE — 1036F TOBACCO NON-USER: CPT | Performed by: FAMILY MEDICINE

## 2024-11-06 PROCEDURE — 99395 PREV VISIT EST AGE 18-39: CPT | Performed by: FAMILY MEDICINE

## 2024-11-06 PROCEDURE — 90656 IIV3 VACC NO PRSV 0.5 ML IM: CPT | Performed by: FAMILY MEDICINE

## 2024-11-06 PROCEDURE — 90471 IMMUNIZATION ADMIN: CPT | Performed by: FAMILY MEDICINE

## 2024-11-06 PROCEDURE — 90472 IMMUNIZATION ADMIN EACH ADD: CPT | Performed by: FAMILY MEDICINE

## 2024-11-06 PROCEDURE — 3008F BODY MASS INDEX DOCD: CPT | Performed by: FAMILY MEDICINE

## 2024-11-06 PROCEDURE — 90651 9VHPV VACCINE 2/3 DOSE IM: CPT | Performed by: FAMILY MEDICINE

## 2024-11-06 ASSESSMENT — ENCOUNTER SYMPTOMS
OCCASIONAL FEELINGS OF UNSTEADINESS: 0
LOSS OF SENSATION IN FEET: 0
DEPRESSION: 0

## 2024-11-06 NOTE — ASSESSMENT & PLAN NOTE
Recommended screening guidelines addressed and orders placed as indicated by age and chronic conditions  Screening labs up to date  Flu shot today  HPV #1 today, #2 in 2 months and #3 in 6 months  Continue to work on healthy lifestyle including well balanced diet, regular activity, limit alcohol, no tobacco products   Follow up annually

## 2024-11-06 NOTE — PROGRESS NOTES
"Reason for Visit: Annual Physical Exam    HPI:  Presents for wellness visit  Doing well with medications, no problems  Not any recurrent gout attacks noted  Had a baby born September, doing well    Active Problem List  Patient Active Problem List   Diagnosis    Gout of multiple sites    Health maintenance examination    Eczema    Gout    Obesity       Comprehensive Medical/Surgical/Social/Family History  Past Medical History:   Diagnosis Date    Other diseases of tongue 09/17/2020    Tongue lesion     Past Surgical History:   Procedure Laterality Date    OTHER SURGICAL HISTORY  09/13/2021    Anal fissurectomy    OTHER SURGICAL HISTORY  09/13/2021    Corneal lasik     Social History     Tobacco Use    Smoking status: Never    Smokeless tobacco: Never   Substance Use Topics    Alcohol use: Not Currently    Drug use: Never     No family history on file.      Allergies and Medications  Patient has no known allergies.  Current Outpatient Medications on File Prior to Visit   Medication Sig Dispense Refill    allopurinol (Zyloprim) 300 mg tablet TAKE 1 TABLET BY MOUTH EVERY DAY 90 tablet 1    atorvastatin (Lipitor) 10 mg tablet TAKE 1 TABLET BY MOUTH EVERY DAY 90 tablet 1    triamcinolone (Kenalog) 0.1 % cream APPLY TO AFFECTED AREA TWICE A DAY 30 g 1    colchicine 0.6 mg tablet TAKE 2 TABLETS, MAY REPEAT 1 TABLET 1 HOUR LATER FOR ACUTE ATTACK (Patient not taking: Reported on 11/6/2024)      mupirocin (Bactroban) 2 % ointment APPLY TO AFFECTED AREA TOPICALLY 3 TIMES A DAY FOR 10 DAYS (Patient not taking: Reported on 11/6/2024)       No current facility-administered medications on file prior to visit.         ROS otherwise negative aside from what was mentioned above in HPI.    Vitals  /88 (BP Location: Left arm, Patient Position: Sitting, BP Cuff Size: Large adult)   Pulse 72   Temp 36.6 °C (97.9 °F) (Temporal)   Ht 1.702 m (5' 7\")   Wt 97.1 kg (214 lb)   SpO2 99%   BMI 33.52 kg/m²   Body mass index is 33.52 " kg/m².  Physical Exam  Gen: Alert, NAD  HEENT:  PERRL, EOMI, conjunctiva and sclera normal in appearance. External auditory canals/TMs normal; Oral cavity and posterior pharynx without lesions/exudate  Neck:  Supple with FROM; No masses/nodes palpable; Thyroid nontender and without nodules; No ALIN  Respiratory:  Lungs CTAB  Cardiovascular:  Heart RRR. No M/R/G. Peripheral pulses equal bilaterally  Abdomen:  Soft, nontender, No R/G/R; No HSM or masses palpated  Extremities:  FROM all extremities; Muscle strength grossly normal with good tone  Neuro:  CN II-XII intact; Gross motor and sensory intact  Skin:  No suspicious lesions present    Assessment and Plan:  Problem List Items Addressed This Visit       Health maintenance examination - Primary    Current Assessment & Plan     Recommended screening guidelines addressed and orders placed as indicated by age and chronic conditions  Screening labs up to date  Flu shot today  HPV #1 today, #2 in 2 months and #3 in 6 months  Continue to work on healthy lifestyle including well balanced diet, regular activity, limit alcohol, no tobacco products   Follow up annually            Other Visit Diagnoses       Need for influenza vaccination        Relevant Orders    Flu vaccine, trivalent, preservative free, age 6 months and greater (Fluraix/Fluzone/Flulaval) (Completed)    Need for HPV vaccination        Relevant Orders    HPV 9-valent vaccine (GARDASIL 9) (Completed)              Suzie Graff MD

## 2024-11-14 ENCOUNTER — APPOINTMENT (OUTPATIENT)
Dept: PRIMARY CARE | Facility: CLINIC | Age: 34
End: 2024-11-14
Payer: COMMERCIAL

## 2024-11-27 ENCOUNTER — APPOINTMENT (OUTPATIENT)
Dept: PRIMARY CARE | Facility: CLINIC | Age: 34
End: 2024-11-27
Payer: COMMERCIAL

## 2025-01-06 ENCOUNTER — APPOINTMENT (OUTPATIENT)
Dept: PRIMARY CARE | Facility: CLINIC | Age: 35
End: 2025-01-06
Payer: COMMERCIAL

## 2025-01-06 DIAGNOSIS — Z23 ENCOUNTER FOR IMMUNIZATION: Primary | ICD-10-CM

## 2025-01-06 PROCEDURE — 90651 9VHPV VACCINE 2/3 DOSE IM: CPT | Performed by: FAMILY MEDICINE

## 2025-01-06 PROCEDURE — 90471 IMMUNIZATION ADMIN: CPT | Performed by: FAMILY MEDICINE

## 2025-01-06 NOTE — PROGRESS NOTES
Pt here for nurse visit to receive #2 of 3 HPV shot. Given in R deltoid.  LOT: [id number]  EXP: 12/11/25    Injection given by: MARGI Gunter  Injection verified by: MARGI Simmons

## 2025-03-16 DIAGNOSIS — E78.2 MIXED HYPERLIPIDEMIA: ICD-10-CM

## 2025-03-16 DIAGNOSIS — L30.9 ECZEMA, UNSPECIFIED TYPE: ICD-10-CM

## 2025-03-17 RX ORDER — TRIAMCINOLONE ACETONIDE 1 MG/G
CREAM TOPICAL 2 TIMES DAILY
Qty: 30 G | Refills: 1 | Status: SHIPPED | OUTPATIENT
Start: 2025-03-17

## 2025-03-17 RX ORDER — ATORVASTATIN CALCIUM 10 MG/1
10 TABLET, FILM COATED ORAL DAILY
Qty: 90 TABLET | Refills: 1 | Status: SHIPPED | OUTPATIENT
Start: 2025-03-17

## 2025-07-09 ENCOUNTER — APPOINTMENT (OUTPATIENT)
Dept: PRIMARY CARE | Facility: CLINIC | Age: 35
End: 2025-07-09
Payer: MEDICAID

## 2025-07-09 PROCEDURE — 90651 9VHPV VACCINE 2/3 DOSE IM: CPT | Performed by: FAMILY MEDICINE

## 2025-07-09 PROCEDURE — 90471 IMMUNIZATION ADMIN: CPT | Performed by: FAMILY MEDICINE

## 2025-11-07 ENCOUNTER — APPOINTMENT (OUTPATIENT)
Dept: PRIMARY CARE | Facility: CLINIC | Age: 35
End: 2025-11-07
Payer: COMMERCIAL